# Patient Record
Sex: MALE | Race: WHITE | HISPANIC OR LATINO | URBAN - METROPOLITAN AREA
[De-identification: names, ages, dates, MRNs, and addresses within clinical notes are randomized per-mention and may not be internally consistent; named-entity substitution may affect disease eponyms.]

---

## 2024-10-22 ENCOUNTER — OFFICE VISIT (OUTPATIENT)
Dept: URGENT CARE | Age: 24
End: 2024-10-22
Payer: COMMERCIAL

## 2024-10-22 VITALS
SYSTOLIC BLOOD PRESSURE: 131 MMHG | RESPIRATION RATE: 22 BRPM | OXYGEN SATURATION: 96 % | HEART RATE: 90 BPM | WEIGHT: 250 LBS | DIASTOLIC BLOOD PRESSURE: 81 MMHG | HEIGHT: 68 IN | TEMPERATURE: 98.9 F | BODY MASS INDEX: 37.89 KG/M2

## 2024-10-22 DIAGNOSIS — J01.10 ACUTE NON-RECURRENT FRONTAL SINUSITIS: ICD-10-CM

## 2024-10-22 DIAGNOSIS — R09.81 NASAL CONGESTION: ICD-10-CM

## 2024-10-22 DIAGNOSIS — R50.9 FEVER, UNSPECIFIED FEVER CAUSE: ICD-10-CM

## 2024-10-22 DIAGNOSIS — R05.1 ACUTE COUGH: Primary | ICD-10-CM

## 2024-10-22 PROCEDURE — 3008F BODY MASS INDEX DOCD: CPT | Performed by: NURSE PRACTITIONER

## 2024-10-22 PROCEDURE — 99213 OFFICE O/P EST LOW 20 MIN: CPT | Performed by: NURSE PRACTITIONER

## 2024-10-22 RX ORDER — AMOXICILLIN AND CLAVULANATE POTASSIUM 875; 125 MG/1; MG/1
875 TABLET, FILM COATED ORAL 2 TIMES DAILY
Qty: 10 TABLET | Refills: 0 | Status: SHIPPED | OUTPATIENT
Start: 2024-10-22 | End: 2024-10-27

## 2024-10-22 RX ORDER — BENZONATATE 100 MG/1
100 CAPSULE ORAL 3 TIMES DAILY PRN
Qty: 20 CAPSULE | Refills: 0 | Status: SHIPPED | OUTPATIENT
Start: 2024-10-22 | End: 2024-10-29

## 2024-10-22 ASSESSMENT — ENCOUNTER SYMPTOMS
COUGH: 1
FEVER: 1

## 2024-10-22 NOTE — LETTER
October 22, 2024     Patient: Todd Montero   YOB: 2000   Date of Visit: 10/22/2024       To Whom It May Concern:    It is my medical opinion that Todd Montero may return to work on 10/23/2024 .    If you have any questions or concerns, please don't hesitate to call.         Sincerely,        OSCAR Chavira-CNP    CC: No Recipients

## 2024-10-22 NOTE — PROGRESS NOTES
"Subjective   Patient ID: Todd Montero is a 23 y.o. male. They present today with a chief complaint of Cough (Headaches and congestion. Started 3 weeks ago but came back this week unable to control with medication like before.).    History of Present Illness  22 y/o M with Cough (Headaches and congestion. Started 3 weeks ago but came back this week unable to control with medication like before. Pt states his cough is worst at night, now causing difficulty sleeping since he wakes up in the middle of the night d/t coughing attacks. Pt states he has intermittent fevers, but denies SOB, CP, n/v, back pain, dizziness      Cough  Associated symptoms include a fever and postnasal drip.       Past Medical History  Allergies as of 10/22/2024    (No Known Allergies)       (Not in a hospital admission)       Past Medical History:   Diagnosis Date    Personal history of other diseases of the digestive system 03/18/2021    History of appendicitis       Past Surgical History:   Procedure Laterality Date    OTHER SURGICAL HISTORY  03/18/2021    Appendectomy            Review of Systems  Review of Systems   Constitutional:  Positive for fever.   HENT:  Positive for postnasal drip.    Respiratory:  Positive for cough.    All other systems reviewed and are negative.                                 Objective    Vitals:    10/22/24 1213   BP: 131/81   BP Location: Left arm   Pulse: 90   Resp: 22   Temp: 37.2 °C (98.9 °F)   TempSrc: Oral   SpO2: 96%   Weight: 113 kg (250 lb)   Height: 1.727 m (5' 8\")     No LMP for male patient.    Physical Exam  Constitutional:       Appearance: Normal appearance.   HENT:      Head: Normocephalic.      Right Ear: A middle ear effusion is present.      Ears:      Comments: B/l TM with bulging/cloudy     Nose:      Right Turbinates: Enlarged.      Left Turbinates: Enlarged.      Right Sinus: Frontal sinus tenderness present.      Left Sinus: Frontal sinus tenderness present.      Comments: B/l " erythematous/edematous turbinates     Mouth/Throat:      Mouth: Mucous membranes are moist.      Pharynx: Oropharynx is clear.   Eyes:      Extraocular Movements: Extraocular movements intact.      Pupils: Pupils are equal, round, and reactive to light.   Cardiovascular:      Rate and Rhythm: Normal rate and regular rhythm.      Pulses: Normal pulses.      Heart sounds: Normal heart sounds.   Pulmonary:      Effort: Pulmonary effort is normal.      Breath sounds: Normal breath sounds.   Musculoskeletal:         General: Normal range of motion.      Cervical back: Normal range of motion and neck supple.   Skin:     General: Skin is warm and dry.   Neurological:      General: No focal deficit present.      Mental Status: He is alert and oriented to person, place, and time.   Psychiatric:         Mood and Affect: Mood normal.         Behavior: Behavior normal.         Procedures    Point of Care Test & Imaging Results from this visit  No results found for this visit on 10/22/24.   No results found.    Diagnostic study results (if any) were reviewed by RAUL Chavira.    Assessment/Plan   Allergies, medications, history, and pertinent labs/EKGs/Imaging reviewed by RAUL Chavira.     Medical Decision Making  Refused testing  22 y/o M with Cough (Headaches and congestion. Started 3 weeks ago but came back this week unable to control with medication like before. Pt states his cough is worst at night, now causing difficulty sleeping since he wakes up in the middle of the night d/t coughing attacks. Pt states he has intermittent fevers, but denies SOB, CP, n/v, back pain, dizziness  suspect frontal sinusitis d/t clinical feature s/s progression and duration including PE so will treat with augmentin, and advised to implement normal saline mist spray, flonase daily, and if s/s persist after 24-48 hours to f/u with PCP    Tessalon pearles PRN  Work note provided  Normal saline mist spray 3 times a day  to clear out sinuses and reduce PND  Advised to take daily anti-histamines   alternate Tylenol and Motrin PRN for discomfort  gargle with warm water and salt  f/u PCP 2-3 days  Take medication as prescribed and incorporate probiotics for gut health  normal saline mist spray three times and day and flonase daily  Increase fluids, to help thin congestion. You might use a cool mist humidifier/vaporizer in your room if the air is dry. This will help thin congestion and keep your sinus moist.  Sleeping in a more upright position can be helpful.  Using saline nose drops or mists can also help thick sinus congestion drain. Apply the mist or drops, then tip your head back and rotate from side to side to help    You can use acetaminophen (paracetamol, Tylenol) or ibuprofen (Motrin) for fever or headache. Drink warm teas with honey.  If you do not improve or you begin to worsen please follow up with your primary care physician.      SEEK FURTHER TREATMENT IF YOU:  --- You have increasing pain or severe headaches.  --- You have nausea, vomiting, or drowsiness.  --- You have swelling around your face.  --- You have vision problems.  --- You have a stiff neck.  --- You have difficulty breathing.      Orders and Diagnoses  Diagnoses and all orders for this visit:  Acute cough  -     benzonatate (Tessalon Perles) 100 mg capsule; Take 1 capsule (100 mg) by mouth 3 times a day as needed for cough for up to 7 days. Do not crush or chew.  Nasal congestion  Fever, unspecified fever cause  Acute non-recurrent frontal sinusitis  -     amoxicillin-pot clavulanate (Augmentin) 875-125 mg tablet; Take 1 tablet (875 mg) by mouth 2 times a day for 5 days.      Medical Admin Record      Patient disposition: Home    Electronically signed by RAUL Chavira  12:47 PM